# Patient Record
Sex: FEMALE | Race: BLACK OR AFRICAN AMERICAN | Employment: STUDENT | URBAN - METROPOLITAN AREA
[De-identification: names, ages, dates, MRNs, and addresses within clinical notes are randomized per-mention and may not be internally consistent; named-entity substitution may affect disease eponyms.]

---
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
## 2022-09-08 ENCOUNTER — OFFICE VISIT (OUTPATIENT)
Dept: FAMILY MEDICINE CLINIC | Age: 22
End: 2022-09-08
Payer: COMMERCIAL

## 2022-09-08 VITALS
OXYGEN SATURATION: 99 % | DIASTOLIC BLOOD PRESSURE: 76 MMHG | BODY MASS INDEX: 26.19 KG/M2 | RESPIRATION RATE: 12 BRPM | HEIGHT: 64 IN | HEART RATE: 53 BPM | WEIGHT: 153.4 LBS | TEMPERATURE: 97.1 F | SYSTOLIC BLOOD PRESSURE: 110 MMHG

## 2022-09-08 DIAGNOSIS — Z00.00 ENCOUNTER FOR WELL ADULT EXAM WITHOUT ABNORMAL FINDINGS: Primary | ICD-10-CM

## 2022-09-08 DIAGNOSIS — Z13.220 LIPID SCREENING: ICD-10-CM

## 2022-09-08 DIAGNOSIS — Z13.0 SCREENING FOR DEFICIENCY ANEMIA: ICD-10-CM

## 2022-09-08 PROCEDURE — 99385 PREV VISIT NEW AGE 18-39: CPT | Performed by: STUDENT IN AN ORGANIZED HEALTH CARE EDUCATION/TRAINING PROGRAM

## 2022-09-08 SDOH — ECONOMIC STABILITY: FOOD INSECURITY: WITHIN THE PAST 12 MONTHS, YOU WORRIED THAT YOUR FOOD WOULD RUN OUT BEFORE YOU GOT MONEY TO BUY MORE.: NEVER TRUE

## 2022-09-08 SDOH — ECONOMIC STABILITY: FOOD INSECURITY: WITHIN THE PAST 12 MONTHS, THE FOOD YOU BOUGHT JUST DIDN'T LAST AND YOU DIDN'T HAVE MONEY TO GET MORE.: NEVER TRUE

## 2022-09-08 ASSESSMENT — PATIENT HEALTH QUESTIONNAIRE - PHQ9
SUM OF ALL RESPONSES TO PHQ QUESTIONS 1-9: 0
2. FEELING DOWN, DEPRESSED OR HOPELESS: 0
SUM OF ALL RESPONSES TO PHQ9 QUESTIONS 1 & 2: 0
1. LITTLE INTEREST OR PLEASURE IN DOING THINGS: 0
SUM OF ALL RESPONSES TO PHQ QUESTIONS 1-9: 0

## 2022-09-08 ASSESSMENT — ENCOUNTER SYMPTOMS
VOMITING: 0
BACK PAIN: 0
COUGH: 0
ABDOMINAL PAIN: 0
NAUSEA: 0
CONSTIPATION: 0
SHORTNESS OF BREATH: 0
DIARRHEA: 0
WHEEZING: 0

## 2022-09-08 ASSESSMENT — SOCIAL DETERMINANTS OF HEALTH (SDOH): HOW HARD IS IT FOR YOU TO PAY FOR THE VERY BASICS LIKE FOOD, HOUSING, MEDICAL CARE, AND HEATING?: NOT HARD AT ALL

## 2022-09-08 NOTE — PROGRESS NOTES
Attending Physician Statement  I have discussed the case, including pertinent history and exam findings with the resident. I agree with the documented assessment and plan as documented by the resident.   GE modifier added to this encounter      Jodi Mathews MD 9/8/2022 10:15 AM

## 2022-09-08 NOTE — PROGRESS NOTES
Well Adult Note  Name: Melissa Pierson Date: 2022   MRN: [id number] Sex: Female   Age: 25 y.o. Ethnicity: Non- / Non    : 2000 Race: Vielka Wallace / Eva Guevara is here for well adult exam.  History:  Patient has been doing well, and denies any acute concerns today. She has been maintaining a balanced diet, and remains active. She is sleeping well, and reports her mood has been good. 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 Patient does have a history of mildly decreased hemoglobin. She denies easy bleeding or bruising. She does have some family history of anemia as well. Review of Systems   Constitutional:  Negative for activity change, chills, fatigue and fever. HENT:  Negative for congestion. Eyes:  Negative for visual disturbance. Respiratory:  Negative for cough, shortness of breath and wheezing. Cardiovascular:  Negative for chest pain and palpitations. Gastrointestinal:  Negative for abdominal pain, constipation, diarrhea, nausea and vomiting. Genitourinary:  Negative for dysuria. Musculoskeletal:  Negative for back pain. Neurological:  Negative for dizziness, syncope, light-headedness and headaches. Psychiatric/Behavioral:  Negative for dysphoric mood. The patient is not nervous/anxious. No Known Allergies      Prior to Visit Medications    Medication Sig Taking?  Authorizing Provider   ibuprofen (ADVIL;MOTRIN) 200 MG tablet Take 400 mg by mouth every 6 hours as needed for Pain Yes Historical Provider, MD         Past Medical History:   Diagnosis Date    Anemia 2008    recheck OK in        Past Surgical History:   Procedure Laterality Date    ACHILLES TENDON SURGERY Right 2020    [facility]- Dr Eliza Baxter          Family History   Problem Relation Age of Onset    Anemia Mother        Social History     Tobacco Use    Smoking status: Never    Smokeless tobacco: Never   Substance Use Topics    Alcohol use: No    Drug use: No       Objective   /76   Pulse 53   Temp 97.1 °F (36.2 °C)   Resp 12   Ht 5' 4\" (1.626 m)   Wt 153 lb 6.4 oz (69.6 kg)   LMP 08/01/2022   SpO2 99%   BMI 26.33 kg/m²   Wt Readings from Last 3 Encounters:   09/08/22 153 lb 6.4 oz (69.6 kg) (83 %, Z= 0.94)*   08/23/21 155 lb (70.3 kg) (86 %, Z= 1.07)*   10/27/20 142 lb (64.4 kg) (77 %, Z= 0.75)*     * Growth percentiles are based on [facility] (Girls, 2-20 Years) data. There were no vitals filed for this visit. Physical Exam  Vitals and nursing note reviewed. Constitutional:       Appearance: Normal appearance. She is normal weight. HENT:      Head: Normocephalic and atraumatic. Right Ear: Tympanic membrane and ear canal normal.      Left Ear: Tympanic membrane and ear canal normal.      Nose: Nose normal.      Mouth/Throat:      Mouth: Mucous membranes are moist.      Pharynx: No oropharyngeal exudate or posterior oropharyngeal erythema. Eyes:      Extraocular Movements: Extraocular movements intact. Conjunctiva/sclera: Conjunctivae normal.      Pupils: Pupils are equal, round, and reactive to light. Cardiovascular:      Rate and Rhythm: Normal rate and regular rhythm. Pulses: Normal pulses. Heart sounds: No murmur heard. Pulmonary:      Effort: Pulmonary effort is normal. No respiratory distress. Breath sounds: Normal breath sounds. No wheezing. Abdominal:      General: Abdomen is flat. Bowel sounds are normal. There is no distension. Palpations: Abdomen is soft. There is no mass. Tenderness: There is no abdominal tenderness. Musculoskeletal:      Cervical back: Neck supple. Skin:     General: Skin is warm and dry. Neurological:      General: No focal deficit present. Mental Status: She is alert and oriented to person, place, and time.    Psychiatric:         Mood and Affect: Mood normal.         Behavior: Behavior normal. Assessment   Plan   1. Encounter for well adult exam without abnormal findings  -     Lipid, Fasting; Future  2. Screening for deficiency anemia  -     CBC with Auto Differential; Future     Patient doing well overall. Encouraged flu vaccine and COVID booster  Will check baseline CBC given history of anemia  Will check lipid profile    Follow-up in 1 year or sooner if needed.     Personalized Preventive Plan   Current Health Maintenance Status  Immunization History   Administered Date(s) Administered    COVID-19, PFIZER PURPLE top, DILUTE for use, (age 15 y+), 30mcg/0.3mL 09/04/2021, 09/25/2021    DTaP 01/02/2003, 03/12/2003, 05/16/2003, 01/15/2004, 11/09/2006    DTaP vaccine 01/02/2003, 03/12/2003, 05/16/2003, 01/15/2004, 11/09/2006    HPV 9-valent Leoncio Deonte) 08/21/2015, 10/23/2015, 03/24/2016    Hepatitis A 11/19/2009, 08/08/2012    Hepatitis A Ped/Adol (Havrix, Vaqta) 11/19/2009, 08/08/2012    Hepatitis B 2002, 01/02/2003, 05/16/2003    Hepatitis B Ped/Adol (Engerix-B, Recombivax HB) 2002, 01/02/2003, 05/16/2003    Hib PRP-OMP (PedvaxHIB) 01/02/2003, 03/12/2003, 05/16/2003    Hib vaccine 01/15/2004    Hib, unspecified 01/02/2003, 03/12/2003, 05/16/2003, 01/15/2004    Influenza, AFLURIA (age 1 yrs+), FLUZONE, (age 10 mo+), MDV, 0.5mL 10/22/2020    MMR 01/15/2004, 11/09/2006    Meningococcal MCV4P (Menactra) 09/12/2014, 08/14/2019    Pneumococcal Conjugate 7-valent (Sammye Gutierres) 11/09/2006    Polio IPV (IPOL) 01/02/2003, 03/12/2003, 05/16/2003, 11/09/2006    Tdap (Boostrix, Adacel) 09/12/2014    Varicella (Varivax) 01/15/2004, 11/19/2009        Health Maintenance   Topic Date Due    COVID-19 Vaccine (3 - Booster for Pfizer series) 02/25/2022    Flu vaccine (1) 09/01/2022    Depression Screen  08/23/2022    Hepatitis C screen  09/08/2023 (Originally 10/24/2020)    HIV screen  09/08/2023 (Originally 10/24/2017)    Chlamydia screen  09/08/2023 (Originally 10/24/2018)    DTaP/Tdap/Td vaccine (7 - Td or Tdap) 09/12/2024    Hepatitis A vaccine  Completed    Hepatitis B vaccine  Completed    Hib vaccine  Completed    HPV vaccine  Completed    Varicella vaccine  Completed    Meningococcal (ACWY) vaccine  Completed    Pneumococcal 0-64 years Vaccine  Aged Out     Recommendations for Preventive Services Due: see orders and patient instructions/AVS.    Return in about 1 year (around 9/8/2023) for Annual physical.

## 2022-09-08 NOTE — LETTER
[address] [address],[address] [facility]  Phone: [phone]  Fax: [phone]    Jack Pereira MD        September 8, 2022     Patient: Alonzo Ozuna   YOB: 2000   Date of Visit: 9/8/2022       To Whom it May Concern:    aGudencio Henriquez was seen in my clinic on 9/8/2022. If you have any questions or concerns, please don't hesitate to call.     Sincerely,         Jack Pereira MD

## 2023-09-07 ASSESSMENT — PATIENT HEALTH QUESTIONNAIRE - PHQ9
SUM OF ALL RESPONSES TO PHQ QUESTIONS 1-9: 0
2. FEELING DOWN, DEPRESSED OR HOPELESS: 0
SUM OF ALL RESPONSES TO PHQ9 QUESTIONS 1 & 2: 0
SUM OF ALL RESPONSES TO PHQ QUESTIONS 1-9: 0
1. LITTLE INTEREST OR PLEASURE IN DOING THINGS: 0
SUM OF ALL RESPONSES TO PHQ9 QUESTIONS 1 & 2: 0
1. LITTLE INTEREST OR PLEASURE IN DOING THINGS: NOT AT ALL
SUM OF ALL RESPONSES TO PHQ QUESTIONS 1-9: 0
SUM OF ALL RESPONSES TO PHQ QUESTIONS 1-9: 0
2. FEELING DOWN, DEPRESSED OR HOPELESS: NOT AT ALL

## 2023-09-08 ENCOUNTER — OFFICE VISIT (OUTPATIENT)
Dept: FAMILY MEDICINE CLINIC | Age: 23
End: 2023-09-08

## 2023-09-08 VITALS
OXYGEN SATURATION: 99 % | DIASTOLIC BLOOD PRESSURE: 76 MMHG | WEIGHT: 157.2 LBS | HEIGHT: 64 IN | TEMPERATURE: 97.8 F | SYSTOLIC BLOOD PRESSURE: 122 MMHG | HEART RATE: 70 BPM | BODY MASS INDEX: 26.84 KG/M2 | RESPIRATION RATE: 12 BRPM

## 2023-09-08 DIAGNOSIS — Z13.220 SCREENING FOR LIPID DISORDERS: ICD-10-CM

## 2023-09-08 DIAGNOSIS — Z00.00 ENCOUNTER FOR WELL ADULT EXAM WITHOUT ABNORMAL FINDINGS: Primary | ICD-10-CM

## 2023-09-08 DIAGNOSIS — Z13.0 SCREENING FOR DEFICIENCY ANEMIA: ICD-10-CM

## 2023-09-08 SDOH — ECONOMIC STABILITY: FOOD INSECURITY: WITHIN THE PAST 12 MONTHS, THE FOOD YOU BOUGHT JUST DIDN'T LAST AND YOU DIDN'T HAVE MONEY TO GET MORE.: NEVER TRUE

## 2023-09-08 SDOH — ECONOMIC STABILITY: INCOME INSECURITY: HOW HARD IS IT FOR YOU TO PAY FOR THE VERY BASICS LIKE FOOD, HOUSING, MEDICAL CARE, AND HEATING?: NOT HARD AT ALL

## 2023-09-08 SDOH — ECONOMIC STABILITY: HOUSING INSECURITY
IN THE LAST 12 MONTHS, WAS THERE A TIME WHEN YOU DID NOT HAVE A STEADY PLACE TO SLEEP OR SLEPT IN A SHELTER (INCLUDING NOW)?: NO

## 2023-09-08 SDOH — ECONOMIC STABILITY: FOOD INSECURITY: WITHIN THE PAST 12 MONTHS, YOU WORRIED THAT YOUR FOOD WOULD RUN OUT BEFORE YOU GOT MONEY TO BUY MORE.: NEVER TRUE

## 2023-09-08 ASSESSMENT — ENCOUNTER SYMPTOMS
DIARRHEA: 0
COUGH: 0
ABDOMINAL PAIN: 0
CONSTIPATION: 0
WHEEZING: 0
SHORTNESS OF BREATH: 0
BACK PAIN: 0
NAUSEA: 0
VOMITING: 0

## 2023-09-08 NOTE — PATIENT INSTRUCTIONS
Thank you   Thank you for trusting us with your healthcare needs. You may receive a survey regarding today's visit. It would help us out if you would take a few moments to provide your feedback. We value your input. Please bring in ALL medications BOTTLES, including inhalers, herbal supplements, over the counter, prescribed & non-prescribed medicine. The office would like actual medication bottles and a list.   Please note our OFFICE POLICIES:   Prior to getting your labs drawn, please check with your insurance company for benefits and eligibility of lab services. Often, insurance companies cover certain tests for preventative visits only. It is patient's responsibility to see what is covered. We are unable to change a diagnosis after the test has been performed. Please hold onto your original lab orders and take them to your lab to be completed. If you no show your scheduled appointment three times, you will be dismissed from this practice. Reschedules must be completed 24 hours prior to your schedule appointment. If the list below has been completed, PLEASE FAX RECORDS TO OUR OFFICE @ [phone].  Once the records have been received we will update your records at our office:  Health Maintenance Due   Topic Date Due    COVID-19 Vaccine (3 - Booster for [place] series) 11/20/2021    Flu vaccine (1) 08/01/2023

## 2023-09-08 NOTE — PROGRESS NOTES
Health Maintenance Due   Topic Date Due    COVID-19 Vaccine (3 - Booster for Pfizer series) 11/20/2021    Flu vaccine (1) 08/01/2023

## 2024-10-02 NOTE — PROGRESS NOTES
risk score is only valid for ages 40 to 79    No results found for: \"NA\", \"K\", \"CL\", \"CO2\", \"BUN\", \"CREATININE\", \"GLUCOSE\", \"CALCIUM\", \"LABALBU\", \"BILITOT\", \"ALKPHOS\", \"AST\", \"ALT\", \"LABGLOM\", \"GFRAA\", \"AGRATIO\", \"GLOB\"  CrCl cannot be calculated (No successful lab value found.).    No results found for: \"MALBCR\"    No results found for: \"TSH\", \"U2XBMKW\", \"THYROIDAB\", \"FT3\", \"T4FREE\"    No results found for: \"WBC\", \"HGB\", \"HCT\", \"MCV\", \"PLT\"    No results found for: \"PSA\"      Immunization History   Administered Date(s) Administered    COVID-19, PFIZER PURPLE top, DILUTE for use, (age 12 y+), 30mcg/0.3mL 09/04/2021, 09/25/2021    DTaP 01/02/2003, 03/12/2003, 05/16/2003, 01/15/2004, 11/09/2006    DTaP vaccine 01/02/2003, 03/12/2003, 05/16/2003, 01/15/2004, 11/09/2006    HPV, GARDASIL 9, (age 9y-45y), IM, 0.5mL 08/21/2015, 10/23/2015, 03/24/2016    Hep A, HAVRIX, VAQTA, (age 12m-18y), IM, 0.5mL 11/19/2009, 08/08/2012    Hep B, ENGERIX-B, RECOMBIVAX-HB, (age Birth - 19y), IM, 0.5mL 2002, 01/02/2003, 05/16/2003    Hepatitis A 11/19/2009, 08/08/2012    Hepatitis B 2002, 01/02/2003, 05/16/2003    Hib PRP-OMP, PEDVAXHIB, (age 2m-6y, Adlt Risk), IM, 0.5mL 01/02/2003, 03/12/2003, 05/16/2003    Hib vaccine 01/15/2004    Hib, unspecified 01/02/2003, 03/12/2003, 05/16/2003, 01/15/2004    Influenza Virus Vaccine 10/22/2020    Influenza, AFLURIA (age 3 y+), FLUZONE, (age 6 mo+), Quadv MDV, 0.5mL 10/22/2020    MMR, PRIORIX, M-M-R II, (age 12m+), SC, 0.5mL 01/15/2004, 11/09/2006    Meningococcal ACWY, MENACTRA (MenACWY-D), (age 9m-55y), IM, 0.5mL 09/12/2014, 08/14/2019    Pneumococcal Conjugate 7-valent (Prevnar7) 11/09/2006    Poliovirus, IPOL, (age 6w+), SC/IM, 0.5mL 01/02/2003, 03/12/2003, 05/16/2003, 11/09/2006    TDaP, ADACEL (age 10y-64y), BOOSTRIX (age 10y+), IM, 0.5mL 09/12/2014    Varicella, VARIVAX, (age 12m+), SC, 0.5mL 01/15/2004, 11/19/2009       Health Maintenance   Topic Date Due    HIV screen  Never done

## 2024-10-07 ASSESSMENT — PATIENT HEALTH QUESTIONNAIRE - PHQ9
2. FEELING DOWN, DEPRESSED OR HOPELESS: NOT AT ALL
SUM OF ALL RESPONSES TO PHQ9 QUESTIONS 1 & 2: 0
SUM OF ALL RESPONSES TO PHQ QUESTIONS 1-9: 0
SUM OF ALL RESPONSES TO PHQ9 QUESTIONS 1 & 2: 0
2. FEELING DOWN, DEPRESSED OR HOPELESS: NOT AT ALL
1. LITTLE INTEREST OR PLEASURE IN DOING THINGS: NOT AT ALL
SUM OF ALL RESPONSES TO PHQ QUESTIONS 1-9: 0
1. LITTLE INTEREST OR PLEASURE IN DOING THINGS: NOT AT ALL

## 2024-10-10 ENCOUNTER — OFFICE VISIT (OUTPATIENT)
Dept: FAMILY MEDICINE CLINIC | Age: 24
End: 2024-10-10
Payer: COMMERCIAL

## 2024-10-10 VITALS
TEMPERATURE: 97.5 F | RESPIRATION RATE: 14 BRPM | OXYGEN SATURATION: 96 % | BODY MASS INDEX: 30.51 KG/M2 | HEART RATE: 80 BPM | HEIGHT: 63 IN | SYSTOLIC BLOOD PRESSURE: 110 MMHG | DIASTOLIC BLOOD PRESSURE: 70 MMHG | WEIGHT: 172.2 LBS

## 2024-10-10 DIAGNOSIS — R10.9 ABDOMINAL DISCOMFORT: ICD-10-CM

## 2024-10-10 DIAGNOSIS — Z00.00 LABORATORY EXAM ORDERED AS PART OF ROUTINE GENERAL MEDICAL EXAMINATION: ICD-10-CM

## 2024-10-10 DIAGNOSIS — Z11.3 SCREENING FOR GONORRHEA: ICD-10-CM

## 2024-10-10 DIAGNOSIS — Z13.220 LIPID SCREENING: ICD-10-CM

## 2024-10-10 DIAGNOSIS — Z13.1 SCREENING FOR DIABETES MELLITUS: ICD-10-CM

## 2024-10-10 DIAGNOSIS — Z00.00 WELL ADULT EXAM: Primary | ICD-10-CM

## 2024-10-10 DIAGNOSIS — Z11.4 SCREENING FOR HIV (HUMAN IMMUNODEFICIENCY VIRUS): ICD-10-CM

## 2024-10-10 DIAGNOSIS — R14.0 BLOATING: ICD-10-CM

## 2024-10-10 DIAGNOSIS — Z11.8 SCREENING FOR CHLAMYDIAL DISEASE: ICD-10-CM

## 2024-10-10 DIAGNOSIS — R14.3 FLATUS: ICD-10-CM

## 2024-10-10 DIAGNOSIS — Z11.59 NEED FOR HEPATITIS C SCREENING TEST: ICD-10-CM

## 2024-10-10 PROCEDURE — 99395 PREV VISIT EST AGE 18-39: CPT | Performed by: FAMILY MEDICINE

## 2024-10-10 SDOH — ECONOMIC STABILITY: FOOD INSECURITY: WITHIN THE PAST 12 MONTHS, YOU WORRIED THAT YOUR FOOD WOULD RUN OUT BEFORE YOU GOT MONEY TO BUY MORE.: NEVER TRUE

## 2024-10-10 SDOH — ECONOMIC STABILITY: FOOD INSECURITY: WITHIN THE PAST 12 MONTHS, THE FOOD YOU BOUGHT JUST DIDN'T LAST AND YOU DIDN'T HAVE MONEY TO GET MORE.: NEVER TRUE

## 2024-10-10 SDOH — ECONOMIC STABILITY: INCOME INSECURITY: HOW HARD IS IT FOR YOU TO PAY FOR THE VERY BASICS LIKE FOOD, HOUSING, MEDICAL CARE, AND HEATING?: NOT HARD AT ALL

## 2024-10-10 ASSESSMENT — ENCOUNTER SYMPTOMS
DIARRHEA: 0
CONSTIPATION: 0
SHORTNESS OF BREATH: 0
CHEST TIGHTNESS: 0
ABDOMINAL PAIN: 0
ANAL BLEEDING: 0
BLOOD IN STOOL: 0
NAUSEA: 0
VOMITING: 0

## 2024-10-10 NOTE — PATIENT INSTRUCTIONS
Encouraged food diary to see if we can determine common food that triggers symptoms. .   Encouraged increased dietary fiber.    Encouraged increased water intake, drinking > 80 ounces of water daily.   Encouraged increased exercise, 30 minutes 5x/ week.    Check Celiac Panel, FLP, CMP, CBC, TSH with reflex free T4, HIV/ HEP C/GC/chlamydia screening at this time  Continue current meds  No refills needed.   Follow up in 12 months.           Preventive Health Topics (updated 10/10/2024):  Encouraged COVID VACCINE-patient declines   Encouraged annual FLU VACCINE- pt declines   Encouraged TETANUS SHOT (TdaP/ ADACEL/ BOOSTRIX)  Pap/pelvic management to be done per Dr. Makayla Rider.

## 2024-12-10 ENCOUNTER — OFFICE VISIT (OUTPATIENT)
Dept: FAMILY MEDICINE CLINIC | Age: 24
End: 2024-12-10

## 2024-12-10 VITALS
HEART RATE: 75 BPM | OXYGEN SATURATION: 98 % | RESPIRATION RATE: 16 BRPM | HEIGHT: 63 IN | TEMPERATURE: 98.5 F | WEIGHT: 168.2 LBS | SYSTOLIC BLOOD PRESSURE: 116 MMHG | DIASTOLIC BLOOD PRESSURE: 72 MMHG | BODY MASS INDEX: 29.8 KG/M2

## 2024-12-10 DIAGNOSIS — Z02.1 PHYSICAL EXAM, PRE-EMPLOYMENT: Primary | ICD-10-CM

## 2024-12-10 DIAGNOSIS — Z13.1 SCREENING FOR DIABETES MELLITUS: ICD-10-CM

## 2024-12-10 DIAGNOSIS — Z23 NEED FOR DIPHTHERIA-TETANUS-PERTUSSIS (TDAP) VACCINE: ICD-10-CM

## 2024-12-17 ASSESSMENT — ENCOUNTER SYMPTOMS
ABDOMINAL PAIN: 0
BLOOD IN STOOL: 0
WHEEZING: 0
PHOTOPHOBIA: 0
NAUSEA: 0
DIARRHEA: 0
COUGH: 0
VOMITING: 0
SHORTNESS OF BREATH: 0
CONSTIPATION: 0

## 2024-12-17 NOTE — PROGRESS NOTES
Attending Physician Statement  I have discussed the case, including pertinent history and exam findings with the resident. I agree with the documented assessment and plan as documented by the resident.  GE modifier added to this encounter      Alayna Larson MD 12/17/2024 12:13 PM   
Mucous membranes are moist.      Pharynx: Oropharynx is clear.   Eyes:      General: No scleral icterus.     Extraocular Movements: Extraocular movements intact.      Conjunctiva/sclera: Conjunctivae normal.   Cardiovascular:      Rate and Rhythm: Normal rate and regular rhythm.      Pulses: Normal pulses.      Heart sounds: Normal heart sounds. No murmur heard.  Pulmonary:      Effort: Pulmonary effort is normal. No respiratory distress.      Breath sounds: Normal breath sounds. No wheezing, rhonchi or rales.   Abdominal:      General: Abdomen is flat. Bowel sounds are normal. There is no distension.      Palpations: Abdomen is soft.      Tenderness: There is no abdominal tenderness.   Musculoskeletal:         General: No swelling, tenderness, deformity or signs of injury.      Cervical back: Normal range of motion. No rigidity or tenderness.   Skin:     General: Skin is warm and dry.      Coloration: Skin is not jaundiced or pale.      Findings: No bruising or rash.   Neurological:      General: No focal deficit present.      Mental Status: She is alert and oriented to person, place, and time.      Motor: No weakness.      Coordination: Coordination normal.      Gait: Gait normal.   Psychiatric:         Mood and Affect: Mood normal.         Behavior: Behavior normal.         Thought Content: Thought content normal.         Judgment: Judgment normal.       Most Recent Data:  No results found for: \"WBC\", \"HGB\", \"HCT\", \"PLT\", \"CHOL\", \"TRIG\", \"HDL\", \"LDLDIRECT\", \"ALT\", \"AST\", \"NA\", \"CL\", \"K\", \"CREATININE\", \"BUN\", \"CO2\", \"TSH\", \"INR\", \"GLUF\", \"LABA1C\", \"PSA\"  XR ANKLE RIGHT (MIN 3 VIEWS)  Narrative: PROCEDURE: XR ANKLE RIGHT (MIN 3 VIEWS)    CLINICAL INFORMATION: achilles tenderness.    COMPARISON: No prior study.    TECHNIQUE: 3 views of the right ankle.    FINDINGS:    No lateralizing swelling. No fracture or malalignment.  Impression: No fracture.    **This report has been created using voice recognition software.

## 2025-07-07 ENCOUNTER — HOSPITAL ENCOUNTER (EMERGENCY)
Age: 25
Discharge: HOME | End: 2025-07-07
Payer: COMMERCIAL

## 2025-07-07 VITALS
BODY MASS INDEX: 29.77 KG/M2 | TEMPERATURE: 101 F | SYSTOLIC BLOOD PRESSURE: 132 MMHG | HEIGHT: 63 IN | WEIGHT: 168 LBS | DIASTOLIC BLOOD PRESSURE: 84 MMHG | OXYGEN SATURATION: 97 % | RESPIRATION RATE: 18 BRPM | HEART RATE: 99 BPM

## 2025-07-07 DIAGNOSIS — J02.9 ACUTE PHARYNGITIS, UNSPECIFIED ETIOLOGY: ICD-10-CM

## 2025-07-07 DIAGNOSIS — B34.9 VIRAL ILLNESS: Primary | ICD-10-CM

## 2025-07-07 LAB — S PYO AG THROAT QL: NEGATIVE

## 2025-07-07 PROCEDURE — 99202 OFFICE O/P NEW SF 15 MIN: CPT

## 2025-07-07 PROCEDURE — 6370000000 HC RX 637 (ALT 250 FOR IP): Performed by: NURSE PRACTITIONER

## 2025-07-07 PROCEDURE — 87651 STREP A DNA AMP PROBE: CPT

## 2025-07-07 PROCEDURE — 99213 OFFICE O/P EST LOW 20 MIN: CPT | Performed by: NURSE PRACTITIONER

## 2025-07-07 RX ORDER — IBUPROFEN 200 MG
600 TABLET ORAL ONCE
Status: COMPLETED | OUTPATIENT
Start: 2025-07-07 | End: 2025-07-07

## 2025-07-07 RX ADMIN — IBUPROFEN 600 MG: 200 TABLET, FILM COATED ORAL at 09:43

## 2025-07-07 ASSESSMENT — ENCOUNTER SYMPTOMS
EYE PAIN: 1
SORE THROAT: 1
RHINORRHEA: 1
SINUS PRESSURE: 0
SINUS PAIN: 0

## 2025-07-07 NOTE — ED PROVIDER NOTES
sinus tenderness or frontal sinus tenderness.      Mouth/Throat:      Mouth: Mucous membranes are moist.      Pharynx: Posterior oropharyngeal erythema present.      Tonsils: No tonsillar exudate. 1+ on the right. 1+ on the left.   Eyes:      Conjunctiva/sclera: Conjunctivae normal.   Cardiovascular:      Rate and Rhythm: Normal rate and regular rhythm.   Pulmonary:      Effort: Pulmonary effort is normal.      Breath sounds: Normal breath sounds.   Musculoskeletal:      Cervical back: Normal range of motion.   Lymphadenopathy:      Cervical: No cervical adenopathy.   Skin:     General: Skin is warm.   Neurological:      Mental Status: She is alert.         DIAGNOSTIC RESULTS     Labs:  Results for orders placed or performed during the hospital encounter of 07/07/25   Strep Screen Group A Throat   Result Value Ref Range    Rapid Strep A Screen NEGATIVE        IMAGING:    No orders to display         EKG:      URGENT CARE COURSE:     Vitals:    07/07/25 0913   BP: 132/84   Pulse: 99   Resp: 18   Temp: (!) 101 °F (38.3 °C)   TempSrc: Oral   SpO2: 97%   Weight: 76.2 kg (168 lb)   Height: 1.6 m (5' 3\")       Medications   ibuprofen (ADVIL;MOTRIN) tablet 600 mg (600 mg Oral Given 7/7/25 0943)            PROCEDURES:  None    FINAL IMPRESSION      1. Viral illness    2. Acute pharyngitis, unspecified etiology          DISPOSITION/ PLAN     Patient seen and evaluated for the above symptoms.  Patient given Ibuprofen while here for temperature of 101.  Strep test negative.  Instructed to use warm salt water gargle, Chloraseptic spray, or cough drops.  May take antihistamine such as Zyrtec or Claritin and Flonase to help dry up drainage.  Instructed to push oral fluids. The Patient is instructed to use over-the-counter Tylenol and Motrin for pain or fever.  Work noted provided.  Instructed to follow-up with their PCP in 3 to 5 days and worsening symptoms.  The patient is agreeable with the above plan and denies questions or

## 2025-07-07 NOTE — DISCHARGE INSTRUCTIONS
Tylenol/Ibuprofen for pain and fevers    OTC cough drops or Chloraspetic spray for sore throat    Warm salt water gargles    May take Zytrec or Claritin and Flonase to help dry up congestion/runny nose    Increase fluid intake    Follow up with PCP in 3 days for follow up or if symptoms do not improve or worsen    Go to ER if trouble breathing or unable to tolerate oral secretions

## 2025-07-07 NOTE — ED TRIAGE NOTES
Patient ambulatory to room 5 for complaint of fever, cough, congestion, and sore throat x 48 hours.  Patient reports that she works in .